# Patient Record
Sex: FEMALE | Race: WHITE
[De-identification: names, ages, dates, MRNs, and addresses within clinical notes are randomized per-mention and may not be internally consistent; named-entity substitution may affect disease eponyms.]

---

## 2017-09-17 ENCOUNTER — HOSPITAL ENCOUNTER (EMERGENCY)
Dept: HOSPITAL 23 - SED | Age: 37
Discharge: HOME | End: 2017-09-17
Payer: COMMERCIAL

## 2017-09-17 DIAGNOSIS — R10.9: Primary | ICD-10-CM

## 2017-09-17 DIAGNOSIS — Z79.891: ICD-10-CM

## 2017-09-17 DIAGNOSIS — Z88.8: ICD-10-CM

## 2017-09-17 DIAGNOSIS — Z88.6: ICD-10-CM

## 2017-09-17 DIAGNOSIS — K21.9: ICD-10-CM

## 2017-09-17 LAB
BARBITURATES UR QL SCN: 0.5 MG/DL
BARBITURATES UR QL SCN: 4.3 G/DL
BASOPHIL#: 0.1 X10E3
BASOPHIL%: 0.9 %
BENZODIAZ UR QL SCN: 14 U/L
BENZODIAZ UR QL SCN: 19 U/L
BLOOD UREA NITROGEN: 8 MG/DL
BUN/CREATININE RATIO: 13.33
BZE UR QL SCN: 58 U/L
CALCIUM SERUM: 9.2 MG/DL
CK MB SERPL-RTO: 13 %
CK MB SERPL-RTO: 34.4 G/DL
CREATININE SERUM: 0.6 MG/DL
DIFF IND: NO
EOSINOPHIL#: 0.1 X10E3
EOSINOPHIL%: 1.2 %
GLOM FILT RATE ESTIMATED: 114.8 ML/MIN
GLUCOSE FASTING: 84 MG/DL
HEMATOCRIT: 39.2 %
HEMOGLOBIN: 13.5 GM/DL
KETONES UR QL: 104 MMOL/L
KETONES UR QL: 27 MMOL/L
LIPASE: 25 U/L
LYMPHOCYTE#: 1.7 X10E3
LYMPHOCYTE%: 22 %
MEAN CELL VOLUME: 89.7 FL
MEAN CORPUSCULAR HEMOGLOBIN: 30.8 PG
MEAN PLATELET VOLUME: 7.4 FL
MICRO INDICATED?: NO
MONOCYTE#: 0.4 X10E3
MONOCYTE%: 5.3 %
NEUTROPHIL#: 5.3 X10E3
NEUTROPHIL%: 70.6 %
PLATELET COUNT: 330 X10E3
POTASSIUM: 4 MMOL/L
PROTEIN TOTAL SERUM: 7.4 G/DL
RED BLOOD COUNT: 4.37 X10E
SODIUM: 138 MMOL/L
URINE APPEARANCE: CLEAR
URINE BILIRUBIN: (no result)
URINE BLOOD: (no result)
URINE COLOR: YELLOW
URINE GLUCOSE: (no result) MG/DL
URINE KETONE: (no result)
URINE LEUKOCYTE ESTERASE: (no result)
URINE NITRATE: (no result)
URINE PH: 7.5
URINE PROTEIN: (no result)
URINE SOURCE: (no result)
URINE SPECIFIC GRAVITY: 1.02
URINE UROBILINOGEN: 0.2 MG/DL
WHITE BLOOD COUNT: 7.6 X10E3

## 2017-09-18 LAB
METHADONE UR QL SCN: 32.7 NG/ML
METHADONE UR QL SCN: <1 NG/ML
POC - TROPONIN: <0.05 NG/ML

## 2022-05-04 ENCOUNTER — HOSPITAL ENCOUNTER (EMERGENCY)
Facility: HOSPITAL | Age: 42
Discharge: HOME | End: 2022-05-04
Attending: EMERGENCY MEDICINE | Admitting: EMERGENCY MEDICINE

## 2022-05-04 ENCOUNTER — APPOINTMENT (OUTPATIENT)
Dept: GENERAL RADIOLOGY | Facility: HOSPITAL | Age: 42
End: 2022-05-04

## 2022-05-04 VITALS
BODY MASS INDEX: 34.87 KG/M2 | HEART RATE: 103 BPM | TEMPERATURE: 97.4 F | SYSTOLIC BLOOD PRESSURE: 125 MMHG | DIASTOLIC BLOOD PRESSURE: 86 MMHG | RESPIRATION RATE: 16 BRPM | WEIGHT: 217 LBS | HEIGHT: 66 IN | OXYGEN SATURATION: 98 %

## 2022-05-04 DIAGNOSIS — R07.9 CHEST PAIN IN ADULT: Primary | ICD-10-CM

## 2022-05-04 LAB
ALBUMIN SERPL-MCNC: 3.9 G/DL (ref 3.5–5.2)
ALBUMIN/GLOB SERPL: 1.3 G/DL
ALP SERPL-CCNC: 90 U/L (ref 39–117)
ALT SERPL W P-5'-P-CCNC: 15 U/L (ref 1–33)
ANION GAP SERPL CALCULATED.3IONS-SCNC: 12 MMOL/L (ref 5–15)
AST SERPL-CCNC: 13 U/L (ref 1–32)
BASOPHILS # BLD AUTO: 0.06 10*3/MM3 (ref 0–0.2)
BASOPHILS NFR BLD AUTO: 0.5 % (ref 0–1.5)
BILIRUB SERPL-MCNC: 0.3 MG/DL (ref 0–1.2)
BUN SERPL-MCNC: 6 MG/DL (ref 6–20)
BUN/CREAT SERPL: 10.9 (ref 7–25)
CALCIUM SPEC-SCNC: 8.8 MG/DL (ref 8.6–10.5)
CHLORIDE SERPL-SCNC: 103 MMOL/L (ref 98–107)
CO2 SERPL-SCNC: 24 MMOL/L (ref 22–29)
CREAT SERPL-MCNC: 0.55 MG/DL (ref 0.57–1)
DEPRECATED RDW RBC AUTO: 42.1 FL (ref 37–54)
EGFRCR SERPLBLD CKD-EPI 2021: 116.1 ML/MIN/1.73
EOSINOPHIL # BLD AUTO: 0.12 10*3/MM3 (ref 0–0.4)
EOSINOPHIL NFR BLD AUTO: 1.1 % (ref 0.3–6.2)
ERYTHROCYTE [DISTWIDTH] IN BLOOD BY AUTOMATED COUNT: 13.4 % (ref 12.3–15.4)
GLOBULIN UR ELPH-MCNC: 3 GM/DL
GLUCOSE SERPL-MCNC: 83 MG/DL (ref 65–99)
HCT VFR BLD AUTO: 36.4 % (ref 34–46.6)
HGB BLD-MCNC: 12.5 G/DL (ref 12–15.9)
IMM GRANULOCYTES # BLD AUTO: 0.03 10*3/MM3 (ref 0–0.05)
IMM GRANULOCYTES NFR BLD AUTO: 0.3 % (ref 0–0.5)
LIPASE SERPL-CCNC: 21 U/L (ref 13–60)
LYMPHOCYTES # BLD AUTO: 1.96 10*3/MM3 (ref 0.7–3.1)
LYMPHOCYTES NFR BLD AUTO: 17.2 % (ref 19.6–45.3)
MCH RBC QN AUTO: 30 PG (ref 26.6–33)
MCHC RBC AUTO-ENTMCNC: 34.3 G/DL (ref 31.5–35.7)
MCV RBC AUTO: 87.5 FL (ref 79–97)
MONOCYTES # BLD AUTO: 0.58 10*3/MM3 (ref 0.1–0.9)
MONOCYTES NFR BLD AUTO: 5.1 % (ref 5–12)
NEUTROPHILS NFR BLD AUTO: 75.8 % (ref 42.7–76)
NEUTROPHILS NFR BLD AUTO: 8.63 10*3/MM3 (ref 1.7–7)
NRBC BLD AUTO-RTO: 0 /100 WBC (ref 0–0.2)
NT-PROBNP SERPL-MCNC: 34.2 PG/ML (ref 0–450)
PLATELET # BLD AUTO: 387 10*3/MM3 (ref 140–450)
PMV BLD AUTO: 8.7 FL (ref 6–12)
POTASSIUM SERPL-SCNC: 3.9 MMOL/L (ref 3.5–5.2)
PROT SERPL-MCNC: 6.9 G/DL (ref 6–8.5)
QT INTERVAL: 356 MS
RBC # BLD AUTO: 4.16 10*6/MM3 (ref 3.77–5.28)
SODIUM SERPL-SCNC: 139 MMOL/L (ref 136–145)
TROPONIN T SERPL-MCNC: <0.01 NG/ML (ref 0–0.03)
TROPONIN T SERPL-MCNC: <0.01 NG/ML (ref 0–0.03)
WBC NRBC COR # BLD: 11.38 10*3/MM3 (ref 3.4–10.8)

## 2022-05-04 PROCEDURE — 71045 X-RAY EXAM CHEST 1 VIEW: CPT

## 2022-05-04 PROCEDURE — 99283 EMERGENCY DEPT VISIT LOW MDM: CPT

## 2022-05-04 PROCEDURE — 83880 ASSAY OF NATRIURETIC PEPTIDE: CPT | Performed by: EMERGENCY MEDICINE

## 2022-05-04 PROCEDURE — 83690 ASSAY OF LIPASE: CPT | Performed by: EMERGENCY MEDICINE

## 2022-05-04 PROCEDURE — 80053 COMPREHEN METABOLIC PANEL: CPT | Performed by: EMERGENCY MEDICINE

## 2022-05-04 PROCEDURE — 93005 ELECTROCARDIOGRAM TRACING: CPT

## 2022-05-04 PROCEDURE — 84484 ASSAY OF TROPONIN QUANT: CPT | Performed by: EMERGENCY MEDICINE

## 2022-05-04 PROCEDURE — 93005 ELECTROCARDIOGRAM TRACING: CPT | Performed by: EMERGENCY MEDICINE

## 2022-05-04 PROCEDURE — 85025 COMPLETE CBC W/AUTO DIFF WBC: CPT | Performed by: EMERGENCY MEDICINE

## 2022-05-04 PROCEDURE — 93010 ELECTROCARDIOGRAM REPORT: CPT | Performed by: INTERNAL MEDICINE

## 2022-05-04 RX ORDER — SODIUM CHLORIDE 0.9 % (FLUSH) 0.9 %
10 SYRINGE (ML) INJECTION AS NEEDED
Status: DISCONTINUED | OUTPATIENT
Start: 2022-05-04 | End: 2022-05-04 | Stop reason: HOSPADM

## 2022-05-04 NOTE — ED PROVIDER NOTES
EMERGENCY DEPARTMENT ENCOUNTER    Room Number:  31/31  Date of encounter:  5/4/2022  PCP: Ada Manzanares MD  Historian: Patient      HPI:  Chief Complaint: Chest discomfort and high blood pressure  A complete HPI/ROS/PMH/PSH/SH/FH are unobtainable due to: Nothing    Context: Ivana Abraham is a 42 y.o. female who presents to the ED c/o patient presents to the emergency department in [place] last night after complaints of high blood pressure.  She said that labs were drawn and resulted but she never saw a provider.  She went home without being seen and went to urgent care this morning with similar symptoms and was sent to the emergency department.    Patient said her blood pressure yesterday was in the 190s systolic, she had dizziness, head was full, and she had some mild, vague discomfort or numbness in the left axilla.  It was not exertional, it came on when she was at rest, and today it is pretty much the same as yesterday.    Patient has no personal history of coronary disease, she is a smoker and has a positive family history of coronary artery disease.      PAST MEDICAL HISTORY  Active Ambulatory Problems     Diagnosis Date Noted   • No Active Ambulatory Problems     Resolved Ambulatory Problems     Diagnosis Date Noted   • No Resolved Ambulatory Problems     Past Medical History:   Diagnosis Date   • Anxiety    • Enlarged heart    • GERD (gastroesophageal reflux disease)    • Leaky heart valve          PAST SURGICAL HISTORY  Past Surgical History:   Procedure Laterality Date   • ANKLE SURGERY     • BACK SURGERY     • BLADDER REPAIR     • CHOLECYSTECTOMY     • DILATATION AND CURETTAGE     • HYSTERECTOMY     • SKIN SURGERY           FAMILY HISTORY  History reviewed. No pertinent family history.      SOCIAL HISTORY  Social History     Socioeconomic History   • Marital status: Single   Tobacco Use   • Smoking status: Current Every Day Smoker     Types: Cigarettes   Substance and Sexual Activity   • Alcohol use: Not  Currently   • Drug use: Yes     Types: Marijuana   • Sexual activity: Yes         ALLERGIES  Docusate, Ibuprofen, Promethazine, Stadol [butorphanol], and Sulfamethoxazole-trimethoprim        REVIEW OF SYSTEMS  Review of Systems     All systems reviewed and negative except for those discussed in HPI.       PHYSICAL EXAM    I have reviewed the triage vital signs and nursing notes.    ED Triage Vitals [05/04/22 1107]   Temp Heart Rate Resp BP SpO2   97.4 °F (36.3 °C) 104 16 -- 99 %      Temp src Heart Rate Source Patient Position BP Location FiO2 (%)   -- -- -- -- --       Physical Exam  GENERAL: not distressed  HENT: nares patent  EYES: no scleral icterus  CV: regular rhythm, regular rate  RESPIRATORY: normal effort, CTA bilaterally  ABDOMEN: soft  MUSCULOSKELETAL: no deformity, no calf tenderness or pedal edema  NEURO: alert, moves all extremities, follows commands  SKIN: warm, dry        LAB RESULTS  Recent Results (from the past 24 hour(s))   MAGNESIUM    Collection Time: 05/03/22  8:28 PM    Specimen: Fresh Frozen Plasma    Specimen type and source: Plasma, Blood   Result Value Ref Range    Magnesium 1.8 1.6 - 2.6 mg/dL   CBC AND DIFFERENTIAL    Collection Time: 05/03/22  8:28 PM    Specimen type and source: Whole Blood, Blood   Result Value Ref Range    WBC 12.71 (H) 4.5 - 11.0 10*3/uL    RBC 4.12 4.0 - 5.2 10*6/uL    Hemoglobin 12.2 12.0 - 16.0 g/dL    Hematocrit 38.4 36.0 - 46.0 %    MCV 93.2 80.0 - 100.0 fL    MCH 29.6 26.0 - 34.0 pg    MCHC 31.8 31.0 - 37.0 g/dL    RDW 14.1 12.0 - 16.8 %    Platelets 414 140 - 440 10*3/uL    MPV 9.0 8.4 - 12.4 fL    Differential Type Hospital CBC w/AutoDiff (arb'U) (arb'U)    Neutrophil Rel % 69.1 45 - 80 %    Lymphocyte Rel % 22.5 15 - 50 %    Monocyte Rel % 5.7 0 - 15 %    Eosinophil % 1.8 0 - 7 %    Basophil Rel % 0.6 0 - 2 %    Immature Grans % 0.3 0.0 - 1.0 %    nRBC 0 0 /100(WBC)    Neutrophils Absolute 8.78 2.0 - 8.8 10*3/uL    Lymphocytes Absolute 2.86 0.7 - 5.5  10*3/uL    Monocytes Absolute 0.72 0.0 - 1.7 10*3/uL    Eosinophils Absolute 0.23 0.0 - 0.8 10*3/uL    Basophils Absolute 0.08 0.0 - 0.2 10*3/uL    Immature Grans, Absolute 0.04 0.00 - 0.10 10*3/uL   TROPONIN (IN-HOUSE)    Collection Time: 05/03/22  8:28 PM    Specimen: Fresh Frozen Plasma    Specimen type and source: Plasma, Blood   Result Value Ref Range    Troponin I <0.010 0.000 - 0.034 ng/mL   ECG 12 Lead    Collection Time: 05/04/22 11:10 AM   Result Value Ref Range    QT Interval 356 ms   Comprehensive Metabolic Panel    Collection Time: 05/04/22 12:02 PM    Specimen: Blood   Result Value Ref Range    Glucose 83 65 - 99 mg/dL    BUN 6 6 - 20 mg/dL    Creatinine 0.55 (L) 0.57 - 1.00 mg/dL    Sodium 139 136 - 145 mmol/L    Potassium 3.9 3.5 - 5.2 mmol/L    Chloride 103 98 - 107 mmol/L    CO2 24.0 22.0 - 29.0 mmol/L    Calcium 8.8 8.6 - 10.5 mg/dL    Total Protein 6.9 6.0 - 8.5 g/dL    Albumin 3.90 3.50 - 5.20 g/dL    ALT (SGPT) 15 1 - 33 U/L    AST (SGOT) 13 1 - 32 U/L    Alkaline Phosphatase 90 39 - 117 U/L    Total Bilirubin 0.3 0.0 - 1.2 mg/dL    Globulin 3.0 gm/dL    A/G Ratio 1.3 g/dL    BUN/Creatinine Ratio 10.9 7.0 - 25.0    Anion Gap 12.0 5.0 - 15.0 mmol/L    eGFR 116.1 >60.0 mL/min/1.73   Lipase    Collection Time: 05/04/22 12:02 PM    Specimen: Blood   Result Value Ref Range    Lipase 21 13 - 60 U/L   BNP    Collection Time: 05/04/22 12:02 PM    Specimen: Blood   Result Value Ref Range    proBNP 34.2 0.0 - 450.0 pg/mL   Troponin    Collection Time: 05/04/22 12:02 PM    Specimen: Blood   Result Value Ref Range    Troponin T <0.010 0.000 - 0.030 ng/mL   CBC Auto Differential    Collection Time: 05/04/22 12:02 PM    Specimen: Blood   Result Value Ref Range    WBC 11.38 (H) 3.40 - 10.80 10*3/mm3    RBC 4.16 3.77 - 5.28 10*6/mm3    Hemoglobin 12.5 12.0 - 15.9 g/dL    Hematocrit 36.4 34.0 - 46.6 %    MCV 87.5 79.0 - 97.0 fL    MCH 30.0 26.6 - 33.0 pg    MCHC 34.3 31.5 - 35.7 g/dL    RDW 13.4 12.3 - 15.4 %     RDW-SD 42.1 37.0 - 54.0 fl    MPV 8.7 6.0 - 12.0 fL    Platelets 387 140 - 450 10*3/mm3    Neutrophil % 75.8 42.7 - 76.0 %    Lymphocyte % 17.2 (L) 19.6 - 45.3 %    Monocyte % 5.1 5.0 - 12.0 %    Eosinophil % 1.1 0.3 - 6.2 %    Basophil % 0.5 0.0 - 1.5 %    Immature Grans % 0.3 0.0 - 0.5 %    Neutrophils, Absolute 8.63 (H) 1.70 - 7.00 10*3/mm3    Lymphocytes, Absolute 1.96 0.70 - 3.10 10*3/mm3    Monocytes, Absolute 0.58 0.10 - 0.90 10*3/mm3    Eosinophils, Absolute 0.12 0.00 - 0.40 10*3/mm3    Basophils, Absolute 0.06 0.00 - 0.20 10*3/mm3    Immature Grans, Absolute 0.03 0.00 - 0.05 10*3/mm3    nRBC 0.0 0.0 - 0.2 /100 WBC   Troponin    Collection Time: 22  2:03 PM    Specimen: Blood   Result Value Ref Range    Troponin T <0.010 0.000 - 0.030 ng/mL       Ordered the above labs and independently reviewed the results.        RADIOLOGY  XR Chest 1 View    Result Date: 2022  XR CHEST 1 VW-  HISTORY: Female who is 42 years-old,  chest pain  TECHNIQUE: Frontal view of the chest  COMPARISON: None available  FINDINGS: Heart, mediastinum and pulmonary vasculature are unremarkable. No focal pulmonary consolidation, pleural effusion, or pneumothorax. No acute osseous process.      No evidence for acute pulmonary process. Follow-up as clinical indications persist.  This report was finalized on 2022 12:01 PM by Dr. Bebo Man M.D.      XR Chest 1 Vw    Result Date: 5/3/2022  REVIEWING YOUR TEST RESULTS IN [facility] IS NOT A SUBSTITUTE FOR DISCUSSING THOSE RESULTS WITH YOUR HEALTH CARE PROVIDER. PLEASE CONTACT YOUR PROVIDER VIA [employer] TO DISCUSS ANY QUESTIONS OR CONCERNS YOU MAY HAVE REGARDING THESE TEST RESULTS.  RADIOLOGY REPORT FACILITY:  [facility] UNIT/AGE/GENDER: M.ED  ER      AGE:42 Y          SEX:F PATIENT NAME/:  DUKE LEVI D    1980 UNIT NUMBER:  [id number] ACCOUNT NUMBER:  [id number] ACCESSION NUMBER:  [id number] EXAM: XR CHEST 1 VW 5/3/2022  9:30 PM HISTORY: Chest Pain COMPARISON: None. TECHNIQUE:  Single PA view of the chest FINDINGS: The heart is normal in size. The lungs are clear. There is no pleural effusion or pneumothorax. IMPRESSION: No acute radiographic abnormality of the chest. Dictated by: Red Jackson M.D. Images and Report reviewed and interpreted by: Red Jackson M.D. <PS><Electronically signed by: Red Jackson M.D.> 05/03/2022 2135 D: 05/03/2022 2135 T: 05/03/2022 2135      I ordered the above noted radiological studies. Reviewed by me and discussed with radiologist.  See dictation for official radiology interpretation.      PROCEDURES    Procedures      MEDICATIONS GIVEN IN ER    Medications - No data to display      PROGRESS, DATA ANALYSIS, CONSULTS, AND MEDICAL DECISION MAKING    All labs have been independently reviewed by me.  All radiology studies have been reviewed by me and discussed with radiologist dictating the report.   EKG's independently viewed and interpreted by me.  Discussion below represents my analysis of pertinent findings related to patient's condition, differential diagnosis, treatment plan and final disposition.    *    ED Course as of 05/04/22 1720   Wed May 04, 2022   1719 CBC and chemistry unremarkable [DP]   1719 Lipase and proBNP normal [DP]   1719 Troponin negative x2 [DP]   1719 Patient has heart score of 2 due to age and family history [DP]   1719 EKG on arrival  Normal sinus rhythm at 90  , VT, QRS and QT  No acute ST segment changes are seen to suggest ischemia, there is nothing available for comparison at this time. [DP]   1720 Patient was also seen yesterday in emergency department had a negative cardiac work-up.  She is low risk for ACS and this is fairly atypical pain which can be followed up in the outpatient setting in my opinion.  I did offer the patient admission to the observation unit for expedited cardiology work-up but she declined and prefers to follow-up [DP]      ED Course User Index  [DP]  Peng Pinedo MD           PPE: The patient wore a surgical mask throughout the entire patient encounter. I wore an N95.    AS OF 17:20 EDT VITALS:    BP - 125/86  HR - 103  TEMP - 97.4 °F (36.3 °C)  O2 SATS - 98%        DIAGNOSIS  Final diagnoses:   Chest pain in adult         DISPOSITION  Discharge         Peng Pinedo MD  05/04/22 5731

## 2022-05-04 NOTE — ED NOTES
Patient from home via PV; c/o chest pain that started yesterday. also having HTN. Seen at [facility] yesterday, had ECG and blood work performed. Patient has no hx of HTN.

## 2022-05-06 ENCOUNTER — OFFICE VISIT (OUTPATIENT)
Dept: CARDIOLOGY | Facility: CLINIC | Age: 42
End: 2022-05-06

## 2022-05-06 VITALS
HEIGHT: 66 IN | DIASTOLIC BLOOD PRESSURE: 80 MMHG | HEART RATE: 94 BPM | BODY MASS INDEX: 34.72 KG/M2 | SYSTOLIC BLOOD PRESSURE: 120 MMHG | WEIGHT: 216 LBS

## 2022-05-06 DIAGNOSIS — R07.2 PRECORDIAL PAIN: ICD-10-CM

## 2022-05-06 DIAGNOSIS — I10 BENIGN ESSENTIAL HTN: ICD-10-CM

## 2022-05-06 DIAGNOSIS — R00.2 PALPITATIONS: Primary | ICD-10-CM

## 2022-05-06 PROCEDURE — 99204 OFFICE O/P NEW MOD 45 MIN: CPT | Performed by: INTERNAL MEDICINE

## 2022-05-06 RX ORDER — GABAPENTIN 600 MG/1
600 TABLET ORAL DAILY
COMMUNITY

## 2022-05-06 RX ORDER — HYDROCODONE BITARTRATE AND ACETAMINOPHEN 10; 325 MG/1; MG/1
TABLET ORAL EVERY 6 HOURS SCHEDULED
COMMUNITY

## 2022-05-06 RX ORDER — MONTELUKAST SODIUM 10 MG/1
10 TABLET ORAL NIGHTLY
COMMUNITY

## 2022-05-06 RX ORDER — ESCITALOPRAM OXALATE 10 MG/1
TABLET ORAL
COMMUNITY
Start: 2021-11-15

## 2022-05-06 RX ORDER — ALBUTEROL SULFATE 90 UG/1
AEROSOL, METERED RESPIRATORY (INHALATION)
COMMUNITY
Start: 2022-01-28

## 2022-05-06 RX ORDER — ERGOCALCIFEROL 1.25 MG/1
CAPSULE ORAL
COMMUNITY
End: 2022-05-06 | Stop reason: SDUPTHER

## 2022-05-06 RX ORDER — COVID-19 ANTIGEN TEST
KIT MISCELLANEOUS
COMMUNITY
End: 2022-05-06

## 2022-05-06 RX ORDER — LORATADINE 10 MG/1
10 TABLET ORAL DAILY
COMMUNITY
Start: 2022-05-05 | End: 2022-08-03

## 2022-05-06 RX ORDER — FUROSEMIDE 40 MG/1
40 TABLET ORAL 2 TIMES DAILY
COMMUNITY

## 2022-05-06 RX ORDER — CETIRIZINE HYDROCHLORIDE 10 MG/1
TABLET ORAL
COMMUNITY
End: 2022-05-06

## 2022-05-06 RX ORDER — GUAIFENESIN 600 MG/1
TABLET, EXTENDED RELEASE ORAL
COMMUNITY
End: 2022-05-06

## 2022-05-06 RX ORDER — HYDROXYZINE HYDROCHLORIDE 25 MG/1
TABLET, FILM COATED ORAL
COMMUNITY

## 2022-05-06 RX ORDER — FLUTICASONE PROPIONATE 50 MCG
SPRAY, SUSPENSION (ML) NASAL
COMMUNITY
Start: 2022-03-25

## 2022-05-06 RX ORDER — ONDANSETRON 4 MG/1
TABLET, ORALLY DISINTEGRATING ORAL
COMMUNITY

## 2022-05-06 RX ORDER — FLUCONAZOLE 150 MG/1
TABLET ORAL
COMMUNITY
End: 2022-05-06

## 2022-05-06 RX ORDER — POTASSIUM CHLORIDE 20 MEQ/1
TABLET, EXTENDED RELEASE ORAL
COMMUNITY

## 2022-05-06 RX ORDER — ESOMEPRAZOLE MAGNESIUM 10 MG/1
GRANULE, FOR SUSPENSION, EXTENDED RELEASE ORAL
COMMUNITY

## 2022-05-06 RX ORDER — PAROXETINE 10 MG/1
TABLET, FILM COATED ORAL
COMMUNITY
End: 2022-05-06 | Stop reason: ALTCHOICE

## 2022-05-06 RX ORDER — MECLIZINE HYDROCHLORIDE 25 MG/1
TABLET ORAL
COMMUNITY

## 2022-05-06 NOTE — PROGRESS NOTES
Subjective:     Encounter Date:22      Patient ID: Ivana Abraham is a 42 y.o. female.    Chief Complaint:  History of Present Illness    Dear Dr. Manzanares,    I had the pleasure of seeing this patient in the office today for initial evaluation and consultation.  I appreciate that you sent her in to see us.  They come in today to be seen for chest discomfort.  She is also demonstrating labile blood pressure values.    On May 3 patient was seen in urgent care center for chest discomfort and hypertension.  Blood work was drawn but she was not seen.  She was then seen in the office by Dr. Norbert Ansari with complaint of chest pain and was sent to the emergency room.  In the emergency room patient was seen by Dr. Pinedo.  Evaluation emergency room was unremarkable.    Patient was then recommended to be seen as an outpatient.    Patient had echocardiogram in January at [facility].  This demonstrated mild LVH, minimal tragus regurgitation, normal left ventricular systolic function.    She has been having episodes of elevated blood pressure.  She just started to keep a blood pressure log at home.  She says sometimes that it would just seem to suddenly go up.  She is also been having episodes of sudden anxiety.  She has had anxiety off and on for much of her life but it got worse after her father  in 2019.  He was actually a patient here as well, he  of coronary disease at age 70.  No other particular family history of cardiac/coronary artery disease except her mother was just found to have some heart failure.  No family history of premature heart disease, no familial history of sudden death.    The following portions of the patient's history were reviewed and updated as appropriate: allergies, current medications, past family history, past medical history, past social history, past surgical history and problem list.    Procedures       Objective:     Vitals:    22 1431   BP: 120/80   Pulse:  "94   Weight: 98 kg (216 lb)   Height: 167.6 cm (66\")     Body mass index is 34.86 kg/m².      Vitals reviewed.   Constitutional:       General: Not in acute distress.     Appearance: Well-developed. Not diaphoretic.   Eyes:      General:         Right eye: No discharge.         Left eye: No discharge.      Conjunctiva/sclera: Conjunctivae normal.      Pupils: Pupils are equal, round, and reactive to light.   HENT:      Head: Normocephalic and atraumatic.      Nose: Nose normal.   Neck:      Thyroid: No thyromegaly.      Trachea: No tracheal deviation.      Lymphadenopathy: No cervical adenopathy.   Pulmonary:      Effort: Pulmonary effort is normal. No respiratory distress.      Breath sounds: Normal breath sounds. No stridor.   Chest:      Chest wall: Not tender to palpatation.   Cardiovascular:      Normal rate. Regular rhythm.      Murmurs: There is no murmur.      . No S3 gallop. No click. No rub.   Pulses:     Intact distal pulses.   Edema:     Peripheral edema absent.   Abdominal:      General: Bowel sounds are normal. There is no distension.      Palpations: Abdomen is soft. There is no abdominal mass.      Tenderness: There is no abdominal tenderness. There is no guarding or rebound.   Musculoskeletal: Normal range of motion.         General: No tenderness or deformity.      Cervical back: Normal range of motion and neck supple. Skin:     General: Skin is warm and dry.      Findings: No erythema or rash.   Neurological:      Mental Status: Alert and oriented to person, place, and time.      Deep Tendon Reflexes: Reflexes are normal and symmetric.   Psychiatric:         Thought Content: Thought content normal.         Data and records reviewed:     Lab Results   Component Value Date    GLUCOSE 83 05/04/2022    BUN 6 05/04/2022    CREATININE 0.55 (L) 05/04/2022    BCR 10.9 05/04/2022    K 3.9 05/04/2022    CO2 24.0 05/04/2022    CALCIUM 8.8 05/04/2022    ALBUMIN 3.90 05/04/2022    AST 13 05/04/2022    ALT 15 " 05/04/2022     No results found for: CHOL  No results found for: TRIG  No results found for: HDL  No results found for: LDL  No results found for: VLDL  No results found for: LDLHDL  CBC    CBC 5/3/22 5/4/22   WBC 12.71 (A) 11.38 (A)   RBC 4.12 4.16   Hemoglobin 12.2 12.5   Hematocrit 38.4 36.4   MCV 93.2 87.5   MCH 29.6 30.0   MCHC 31.8 34.3   RDW 14.1 13.4   Platelets 414 387   (A) Abnormal value            XR Chest 2 View    Result Date: 4/18/2022  No acute cardiopulmonary process.   Signed by Ora Weeks, DO    XR Chest 1 View    Result Date: 5/4/2022  No evidence for acute pulmonary process. Follow-up as clinical indications persist.  This report was finalized on 5/4/2022 12:01 PM by Dr. Bebo Man M.D.              Assessment:          Diagnosis Plan   1. Palpitations  Treadmill Stress Test    Catecholamine+VMA, 24-Hr Urine - Urine, Clean Catch   2. Precordial pain  Treadmill Stress Test    Catecholamine+VMA, 24-Hr Urine - Urine, Clean Catch   3. Benign essential HTN  Treadmill Stress Test    Catecholamine+VMA, 24-Hr Urine - Urine, Clean Catch          Plan:       .  Labile hypertension- she apparently is having episodes of sudden increase in heart rate tachycardia anxiety, we will check urinary VMA  2.  Chest discomfort- unremarkable EKG, evaluation ER unremarkable we will arrange for stress test  3.?  Hypertension, blood pressures been labile, we will have her keep a blood pressure log.    Thank you very much for allowing us to participate in the care of this pleasant patient.  Please don't hesitate to call if I can be of assistance in any way.      Current Outpatient Medications:   •  albuterol sulfate  (90 Base) MCG/ACT inhaler, albuterol sulfate HFA 90 mcg/actuation aerosol inhaler, Disp: , Rfl:   •  Cholecalciferol (VITAMIN D3 PO), Take 1 tablet by mouth Daily., Disp: , Rfl:   •  cyanocobalamin (VITAMIN B-12) 1000 MCG tablet, Take 1,000 mcg by mouth Daily., Disp: , Rfl:   •  escitalopram  (LEXAPRO) 10 MG tablet, escitalopram 10 mg tablet  Take 1 tablet every day by oral route as directed for 30 days., Disp: , Rfl:   •  esomeprazole (NexIUM) 10 MG packet, Take  by mouth., Disp: , Rfl:   •  fluticasone (FLONASE) 50 MCG/ACT nasal spray, fluticasone propionate 50 mcg/actuation nasal spray,suspension, Disp: , Rfl:   •  furosemide (LASIX) 40 MG tablet, Take 40 mg by mouth 2 (Two) Times a Day., Disp: , Rfl:   •  gabapentin (NEURONTIN) 600 MG tablet, Take 600 mg by mouth Daily., Disp: , Rfl:   •  HYDROcodone-acetaminophen (NORCO)  MG per tablet, Every 6 (Six) Hours., Disp: , Rfl:   •  hydrOXYzine (ATARAX) 25 MG tablet, hydroxyzine HCl 25 mg tablet, Disp: , Rfl:   •  loratadine (CLARITIN) 10 MG tablet, Take 10 mg by mouth Daily., Disp: , Rfl:   •  meclizine (ANTIVERT) 25 MG tablet, meclizine 25 mg tablet, Disp: , Rfl:   •  montelukast (SINGULAIR) 10 MG tablet, Take 10 mg by mouth Every Night., Disp: , Rfl:   •  ondansetron ODT (ZOFRAN-ODT) 4 MG disintegrating tablet, ondansetron 4 mg disintegrating tablet, Disp: , Rfl:   •  potassium chloride (K-DUR,KLOR-CON) 20 MEQ CR tablet, potassium chloride ER 20 mEq tablet,extended release  Take 1 tablet every day by oral route as directed for 30 days., Disp: , Rfl:          No follow-ups on file.

## 2022-05-10 ENCOUNTER — APPOINTMENT (OUTPATIENT)
Dept: CARDIOLOGY | Facility: HOSPITAL | Age: 42
End: 2022-05-10

## 2022-06-03 ENCOUNTER — HOSPITAL ENCOUNTER (OUTPATIENT)
Dept: CARDIOLOGY | Facility: HOSPITAL | Age: 42
Discharge: HOME | End: 2022-06-03
Admitting: INTERNAL MEDICINE

## 2022-06-03 DIAGNOSIS — I10 BENIGN ESSENTIAL HTN: ICD-10-CM

## 2022-06-03 DIAGNOSIS — R00.2 PALPITATIONS: ICD-10-CM

## 2022-06-03 DIAGNOSIS — R07.2 PRECORDIAL PAIN: ICD-10-CM

## 2022-06-03 LAB
BH CV STRESS BP STAGE 1: NORMAL
BH CV STRESS DURATION MIN STAGE 1: 3
BH CV STRESS DURATION MIN STAGE 2: 1
BH CV STRESS DURATION SEC STAGE 1: 0
BH CV STRESS DURATION SEC STAGE 2: 0
BH CV STRESS GRADE STAGE 1: 10
BH CV STRESS GRADE STAGE 2: 12
BH CV STRESS HR STAGE 1: 140
BH CV STRESS HR STAGE 2: 152
BH CV STRESS METS STAGE 1: 5
BH CV STRESS METS STAGE 2: 5
BH CV STRESS PROTOCOL 1: NORMAL
BH CV STRESS RECOVERY BP: NORMAL MMHG
BH CV STRESS RECOVERY HR: 106 BPM
BH CV STRESS SPEED STAGE 1: 1.7
BH CV STRESS SPEED STAGE 2: 2.5
BH CV STRESS STAGE 1: 1
BH CV STRESS STAGE 2: 2
MAXIMAL PREDICTED HEART RATE: 176 BPM
PERCENT MAX PREDICTED HR: 86.36 %
STRESS BASELINE BP: NORMAL MMHG
STRESS BASELINE HR: 103 BPM
STRESS PERCENT HR: 102 %
STRESS POST ESTIMATED WORKLOAD: 5 METS
STRESS POST EXERCISE DUR MIN: 4 MIN
STRESS POST EXERCISE DUR SEC: 0 SEC
STRESS POST PEAK BP: NORMAL MMHG
STRESS POST PEAK HR: 152 BPM
STRESS TARGET HR: 150 BPM

## 2022-06-03 PROCEDURE — 93016 CV STRESS TEST SUPVJ ONLY: CPT | Performed by: INTERNAL MEDICINE

## 2022-06-03 PROCEDURE — 93018 CV STRESS TEST I&R ONLY: CPT | Performed by: INTERNAL MEDICINE

## 2022-06-03 PROCEDURE — 93017 CV STRESS TEST TRACING ONLY: CPT

## 2024-04-16 ENCOUNTER — HOSPITAL ENCOUNTER (EMERGENCY)
Facility: HOSPITAL | Age: 44
Discharge: HOME | End: 2024-04-16
Attending: STUDENT IN AN ORGANIZED HEALTH CARE EDUCATION/TRAINING PROGRAM | Admitting: STUDENT IN AN ORGANIZED HEALTH CARE EDUCATION/TRAINING PROGRAM
Payer: COMMERCIAL

## 2024-04-16 VITALS
HEART RATE: 100 BPM | BODY MASS INDEX: 35.36 KG/M2 | HEIGHT: 66 IN | WEIGHT: 220 LBS | OXYGEN SATURATION: 98 % | DIASTOLIC BLOOD PRESSURE: 83 MMHG | SYSTOLIC BLOOD PRESSURE: 122 MMHG | TEMPERATURE: 98.8 F | RESPIRATION RATE: 16 BRPM

## 2024-04-16 DIAGNOSIS — J02.9 ACUTE PHARYNGITIS, UNSPECIFIED ETIOLOGY: Primary | ICD-10-CM

## 2024-04-16 LAB
ALBUMIN SERPL-MCNC: 3.7 G/DL (ref 3.5–5.2)
ALBUMIN/GLOB SERPL: 1.1 G/DL
ALP SERPL-CCNC: 90 U/L (ref 39–117)
ALT SERPL W P-5'-P-CCNC: 8 U/L (ref 1–33)
ANION GAP SERPL CALCULATED.3IONS-SCNC: 9.5 MMOL/L (ref 5–15)
AST SERPL-CCNC: 12 U/L (ref 1–32)
B PARAPERT DNA SPEC QL NAA+PROBE: NOT DETECTED
B PERT DNA SPEC QL NAA+PROBE: NOT DETECTED
BASOPHILS # BLD AUTO: 0.07 10*3/MM3 (ref 0–0.2)
BASOPHILS NFR BLD AUTO: 0.7 % (ref 0–1.5)
BILIRUB SERPL-MCNC: <0.2 MG/DL (ref 0–1.2)
BILIRUB UR QL STRIP: NEGATIVE
BUN SERPL-MCNC: 7 MG/DL (ref 6–20)
BUN/CREAT SERPL: 10 (ref 7–25)
C PNEUM DNA NPH QL NAA+NON-PROBE: NOT DETECTED
CALCIUM SPEC-SCNC: 9 MG/DL (ref 8.6–10.5)
CHLORIDE SERPL-SCNC: 101 MMOL/L (ref 98–107)
CLARITY UR: CLEAR
CO2 SERPL-SCNC: 27.5 MMOL/L (ref 22–29)
COLOR UR: YELLOW
CREAT SERPL-MCNC: 0.7 MG/DL (ref 0.57–1)
DEPRECATED RDW RBC AUTO: 42.4 FL (ref 37–54)
EGFRCR SERPLBLD CKD-EPI 2021: 108.8 ML/MIN/1.73
EOSINOPHIL # BLD AUTO: 0.19 10*3/MM3 (ref 0–0.4)
EOSINOPHIL NFR BLD AUTO: 2 % (ref 0.3–6.2)
ERYTHROCYTE [DISTWIDTH] IN BLOOD BY AUTOMATED COUNT: 13 % (ref 12.3–15.4)
FLUAV SUBTYP SPEC NAA+PROBE: NOT DETECTED
FLUBV RNA ISLT QL NAA+PROBE: NOT DETECTED
GLOBULIN UR ELPH-MCNC: 3.4 GM/DL
GLUCOSE SERPL-MCNC: 112 MG/DL (ref 65–99)
GLUCOSE UR STRIP-MCNC: NEGATIVE MG/DL
HADV DNA SPEC NAA+PROBE: NOT DETECTED
HCOV 229E RNA SPEC QL NAA+PROBE: NOT DETECTED
HCOV HKU1 RNA SPEC QL NAA+PROBE: NOT DETECTED
HCOV NL63 RNA SPEC QL NAA+PROBE: NOT DETECTED
HCOV OC43 RNA SPEC QL NAA+PROBE: NOT DETECTED
HCT VFR BLD AUTO: 37.6 % (ref 34–46.6)
HETEROPH AB SER QL LA: NEGATIVE
HGB BLD-MCNC: 12.1 G/DL (ref 12–15.9)
HGB UR QL STRIP.AUTO: NEGATIVE
HMPV RNA NPH QL NAA+NON-PROBE: NOT DETECTED
HPIV1 RNA ISLT QL NAA+PROBE: NOT DETECTED
HPIV2 RNA SPEC QL NAA+PROBE: NOT DETECTED
HPIV3 RNA NPH QL NAA+PROBE: NOT DETECTED
HPIV4 P GENE NPH QL NAA+PROBE: NOT DETECTED
IMM GRANULOCYTES # BLD AUTO: 0.04 10*3/MM3 (ref 0–0.05)
IMM GRANULOCYTES NFR BLD AUTO: 0.4 % (ref 0–0.5)
KETONES UR QL STRIP: NEGATIVE
LEUKOCYTE ESTERASE UR QL STRIP.AUTO: NEGATIVE
LYMPHOCYTES # BLD AUTO: 2.42 10*3/MM3 (ref 0.7–3.1)
LYMPHOCYTES NFR BLD AUTO: 24.9 % (ref 19.6–45.3)
M PNEUMO IGG SER IA-ACNC: NOT DETECTED
MCH RBC QN AUTO: 28.7 PG (ref 26.6–33)
MCHC RBC AUTO-ENTMCNC: 32.2 G/DL (ref 31.5–35.7)
MCV RBC AUTO: 89.3 FL (ref 79–97)
MONOCYTES # BLD AUTO: 0.49 10*3/MM3 (ref 0.1–0.9)
MONOCYTES NFR BLD AUTO: 5 % (ref 5–12)
NEUTROPHILS NFR BLD AUTO: 6.5 10*3/MM3 (ref 1.7–7)
NEUTROPHILS NFR BLD AUTO: 67 % (ref 42.7–76)
NITRITE UR QL STRIP: NEGATIVE
NRBC BLD AUTO-RTO: 0 /100 WBC (ref 0–0.2)
PH UR STRIP.AUTO: 6.5 [PH] (ref 5–8)
PLATELET # BLD AUTO: 398 10*3/MM3 (ref 140–450)
PMV BLD AUTO: 8.9 FL (ref 6–12)
POTASSIUM SERPL-SCNC: 4.3 MMOL/L (ref 3.5–5.2)
PROT SERPL-MCNC: 7.1 G/DL (ref 6–8.5)
PROT UR QL STRIP: NEGATIVE
RBC # BLD AUTO: 4.21 10*6/MM3 (ref 3.77–5.28)
RHINOVIRUS RNA SPEC NAA+PROBE: NOT DETECTED
RSV RNA NPH QL NAA+NON-PROBE: NOT DETECTED
SARS-COV-2 RNA NPH QL NAA+NON-PROBE: NOT DETECTED
SODIUM SERPL-SCNC: 138 MMOL/L (ref 136–145)
SP GR UR STRIP: 1.01 (ref 1–1.03)
UROBILINOGEN UR QL STRIP: NORMAL
WBC NRBC COR # BLD AUTO: 9.71 10*3/MM3 (ref 3.4–10.8)

## 2024-04-16 PROCEDURE — 96375 TX/PRO/DX INJ NEW DRUG ADDON: CPT

## 2024-04-16 PROCEDURE — 86308 HETEROPHILE ANTIBODY SCREEN: CPT | Performed by: STUDENT IN AN ORGANIZED HEALTH CARE EDUCATION/TRAINING PROGRAM

## 2024-04-16 PROCEDURE — 81003 URINALYSIS AUTO W/O SCOPE: CPT | Performed by: STUDENT IN AN ORGANIZED HEALTH CARE EDUCATION/TRAINING PROGRAM

## 2024-04-16 PROCEDURE — 0202U NFCT DS 22 TRGT SARS-COV-2: CPT | Performed by: STUDENT IN AN ORGANIZED HEALTH CARE EDUCATION/TRAINING PROGRAM

## 2024-04-16 PROCEDURE — 25010000002 KETOROLAC TROMETHAMINE PER 15 MG: Performed by: STUDENT IN AN ORGANIZED HEALTH CARE EDUCATION/TRAINING PROGRAM

## 2024-04-16 PROCEDURE — 80053 COMPREHEN METABOLIC PANEL: CPT | Performed by: STUDENT IN AN ORGANIZED HEALTH CARE EDUCATION/TRAINING PROGRAM

## 2024-04-16 PROCEDURE — 86665 EPSTEIN-BARR CAPSID VCA: CPT | Performed by: STUDENT IN AN ORGANIZED HEALTH CARE EDUCATION/TRAINING PROGRAM

## 2024-04-16 PROCEDURE — 86664 EPSTEIN-BARR NUCLEAR ANTIGEN: CPT | Performed by: STUDENT IN AN ORGANIZED HEALTH CARE EDUCATION/TRAINING PROGRAM

## 2024-04-16 PROCEDURE — 96374 THER/PROPH/DIAG INJ IV PUSH: CPT

## 2024-04-16 PROCEDURE — 99283 EMERGENCY DEPT VISIT LOW MDM: CPT

## 2024-04-16 PROCEDURE — 25010000002 DEXAMETHASONE PER 1 MG: Performed by: STUDENT IN AN ORGANIZED HEALTH CARE EDUCATION/TRAINING PROGRAM

## 2024-04-16 PROCEDURE — 85025 COMPLETE CBC W/AUTO DIFF WBC: CPT | Performed by: STUDENT IN AN ORGANIZED HEALTH CARE EDUCATION/TRAINING PROGRAM

## 2024-04-16 RX ORDER — KETOROLAC TROMETHAMINE 10 MG/1
10 TABLET, FILM COATED ORAL EVERY 6 HOURS PRN
Qty: 30 TABLET | Refills: 0 | Status: SHIPPED | OUTPATIENT
Start: 2024-04-16

## 2024-04-16 RX ORDER — CEFDINIR 300 MG/1
300 CAPSULE ORAL 2 TIMES DAILY
Qty: 14 CAPSULE | Refills: 0 | Status: SHIPPED | OUTPATIENT
Start: 2024-04-16

## 2024-04-16 RX ORDER — DEXAMETHASONE 6 MG/1
6 TABLET ORAL
Qty: 4 TABLET | Refills: 0 | Status: SHIPPED | OUTPATIENT
Start: 2024-04-16

## 2024-04-16 RX ORDER — DEXAMETHASONE SODIUM PHOSPHATE 10 MG/ML
8 INJECTION INTRAMUSCULAR; INTRAVENOUS ONCE
Status: COMPLETED | OUTPATIENT
Start: 2024-04-16 | End: 2024-04-16

## 2024-04-16 RX ORDER — KETOROLAC TROMETHAMINE 15 MG/ML
15 INJECTION, SOLUTION INTRAMUSCULAR; INTRAVENOUS ONCE
Status: COMPLETED | OUTPATIENT
Start: 2024-04-16 | End: 2024-04-16

## 2024-04-16 RX ADMIN — DEXAMETHASONE SODIUM PHOSPHATE 8 MG: 10 INJECTION INTRAMUSCULAR; INTRAVENOUS at 18:33

## 2024-04-16 RX ADMIN — KETOROLAC TROMETHAMINE 15 MG: 15 INJECTION, SOLUTION INTRAMUSCULAR; INTRAVENOUS at 20:35

## 2024-04-16 NOTE — ED NOTES
Patient to ER via car from home for throat swelling and pain white spots on throat eye drainage and ear pain    Patient recently finished abx for same

## 2024-04-17 LAB
EBV NA IGG SER IA-ACNC: >600 U/ML (ref 0–17.9)
EBV VCA IGG SER IA-ACNC: >600 U/ML (ref 0–17.9)
EBV VCA IGM SER IA-ACNC: <36 U/ML (ref 0–35.9)
SERVICE CMNT-IMP: ABNORMAL

## 2024-04-17 NOTE — ED PROVIDER NOTES
EMERGENCY DEPARTMENT ENCOUNTER    Room Number:  14/14  PCP: Ada Manzanares MD  History obtained from: Patient      HPI:  Chief Complaint: Throat pain  A complete HPI/ROS/PMH/PSH/SH/FH are unobtainable due to:   Context: Ivana Abraham is a 44 y.o. female who presents to the ED c/o throat pain.  Ongoing for the last 2 to 3 weeks.  Initially felt like she had some improvement after course of Augmentin over the last several days she reports that it feels like it is getting worse again.  Feels like there is associated swelling.  No fever.  Some fullness in the left ear.  No nausea or vomiting.  Initially had some conjunctivitis however this is significantly improved with Romycin ointment.            PAST MEDICAL HISTORY  Active Ambulatory Problems     Diagnosis Date Noted    No Active Ambulatory Problems     Resolved Ambulatory Problems     Diagnosis Date Noted    No Resolved Ambulatory Problems     Past Medical History:   Diagnosis Date    Anxiety     Chest pain     Dizziness     Elevated blood pressure reading     Enlarged heart     GERD (gastroesophageal reflux disease)     Leaky heart valve          PAST SURGICAL HISTORY  Past Surgical History:   Procedure Laterality Date    ANKLE SURGERY      BACK SURGERY      BLADDER REPAIR      CHOLECYSTECTOMY      DILATATION AND CURETTAGE      HYSTERECTOMY      SKIN SURGERY           FAMILY HISTORY  Family History   Problem Relation Age of Onset    Heart failure Mother     Heart disease Father     Heart attack Father          SOCIAL HISTORY  Social History     Socioeconomic History    Marital status: Single   Tobacco Use    Smoking status: Every Day     Types: Cigarettes    Smokeless tobacco: Never   Substance and Sexual Activity    Alcohol use: Not Currently    Drug use: Yes     Types: Marijuana    Sexual activity: Yes         ALLERGIES  Docusate, Ibuprofen, Promethazine, Stadol [butorphanol], and Sulfamethoxazole-trimethoprim        REVIEW OF SYSTEMS    As per HPI      PHYSICAL  EXAM  ED Triage Vitals   Temp Heart Rate Resp BP SpO2   04/16/24 1610 04/16/24 1610 04/16/24 1610 04/16/24 1613 04/16/24 1610   98.8 °F (37.1 °C) (!) 125 16 137/85 98 %      Temp src Heart Rate Source Patient Position BP Location FiO2 (%)   -- -- -- -- --              Physical Exam  Constitutional:       General: She is not in acute distress.  HENT:      Head: Normocephalic and atraumatic.      Mouth/Throat:      Comments: Erythematous posterior oropharynx with slight exudate on the left tonsillar, no significant tonsillar swelling or asymmetry  Cardiovascular:      Rate and Rhythm: Normal rate and regular rhythm.   Pulmonary:      Effort: Pulmonary effort is normal. No respiratory distress.   Abdominal:      General: There is no distension.      Palpations: Abdomen is soft.      Tenderness: There is no abdominal tenderness.   Musculoskeletal:         General: No swelling or deformity.   Skin:     General: Skin is warm and dry.   Neurological:      Mental Status: She is alert. Mental status is at baseline.           Vital signs and nursing notes reviewed.          LAB RESULTS  Recent Results (from the past 24 hour(s))   Urinalysis With Microscopic If Indicated (No Culture) - Urine, Clean Catch    Collection Time: 04/16/24  6:20 PM    Specimen: Urine, Clean Catch   Result Value Ref Range    Color, UA Yellow Yellow, Straw    Appearance, UA Clear Clear    pH, UA 6.5 5.0 - 8.0    Specific Gravity, UA 1.008 1.005 - 1.030    Glucose, UA Negative Negative    Ketones, UA Negative Negative    Bilirubin, UA Negative Negative    Blood, UA Negative Negative    Protein, UA Negative Negative    Leuk Esterase, UA Negative Negative    Nitrite, UA Negative Negative    Urobilinogen, UA 0.2 E.U./dL 0.2 - 1.0 E.U./dL   Comprehensive Metabolic Panel    Collection Time: 04/16/24  6:25 PM    Specimen: Blood   Result Value Ref Range    Glucose 112 (H) 65 - 99 mg/dL    BUN 7 6 - 20 mg/dL    Creatinine 0.70 0.57 - 1.00 mg/dL    Sodium 138  136 - 145 mmol/L    Potassium 4.3 3.5 - 5.2 mmol/L    Chloride 101 98 - 107 mmol/L    CO2 27.5 22.0 - 29.0 mmol/L    Calcium 9.0 8.6 - 10.5 mg/dL    Total Protein 7.1 6.0 - 8.5 g/dL    Albumin 3.7 3.5 - 5.2 g/dL    ALT (SGPT) 8 1 - 33 U/L    AST (SGOT) 12 1 - 32 U/L    Alkaline Phosphatase 90 39 - 117 U/L    Total Bilirubin <0.2 0.0 - 1.2 mg/dL    Globulin 3.4 gm/dL    A/G Ratio 1.1 g/dL    BUN/Creatinine Ratio 10.0 7.0 - 25.0    Anion Gap 9.5 5.0 - 15.0 mmol/L    eGFR 108.8 >60.0 mL/min/1.73   Mononucleosis Screen    Collection Time: 04/16/24  6:25 PM    Specimen: Blood   Result Value Ref Range    Monospot Negative Negative   CBC Auto Differential    Collection Time: 04/16/24  6:25 PM    Specimen: Blood   Result Value Ref Range    WBC 9.71 3.40 - 10.80 10*3/mm3    RBC 4.21 3.77 - 5.28 10*6/mm3    Hemoglobin 12.1 12.0 - 15.9 g/dL    Hematocrit 37.6 34.0 - 46.6 %    MCV 89.3 79.0 - 97.0 fL    MCH 28.7 26.6 - 33.0 pg    MCHC 32.2 31.5 - 35.7 g/dL    RDW 13.0 12.3 - 15.4 %    RDW-SD 42.4 37.0 - 54.0 fl    MPV 8.9 6.0 - 12.0 fL    Platelets 398 140 - 450 10*3/mm3    Neutrophil % 67.0 42.7 - 76.0 %    Lymphocyte % 24.9 19.6 - 45.3 %    Monocyte % 5.0 5.0 - 12.0 %    Eosinophil % 2.0 0.3 - 6.2 %    Basophil % 0.7 0.0 - 1.5 %    Immature Grans % 0.4 0.0 - 0.5 %    Neutrophils, Absolute 6.50 1.70 - 7.00 10*3/mm3    Lymphocytes, Absolute 2.42 0.70 - 3.10 10*3/mm3    Monocytes, Absolute 0.49 0.10 - 0.90 10*3/mm3    Eosinophils, Absolute 0.19 0.00 - 0.40 10*3/mm3    Basophils, Absolute 0.07 0.00 - 0.20 10*3/mm3    Immature Grans, Absolute 0.04 0.00 - 0.05 10*3/mm3    nRBC 0.0 0.0 - 0.2 /100 WBC   Respiratory Panel PCR w/COVID-19(SARS-CoV-2) ROGER/ROSIE/MARTHA/PAD/COR/MANUEL In-House, [facility], 2 HR TAT - Swab, Nasopharynx    Collection Time: 04/16/24  6:26 PM    Specimen: Nasopharynx; Swab   Result Value Ref Range    ADENOVIRUS, PCR Not Detected Not Detected    Coronavirus 229E Not Detected Not Detected    Coronavirus HKU1 Not  Detected Not Detected    Coronavirus NL63 Not Detected Not Detected    Coronavirus OC43 Not Detected Not Detected    COVID19 Not Detected Not Detected - Ref. Range    Human Metapneumovirus Not Detected Not Detected    Human Rhinovirus/Enterovirus Not Detected Not Detected    Influenza A PCR Not Detected Not Detected    Influenza B PCR Not Detected Not Detected    Parainfluenza Virus 1 Not Detected Not Detected    Parainfluenza Virus 2 Not Detected Not Detected    Parainfluenza Virus 3 Not Detected Not Detected    Parainfluenza Virus 4 Not Detected Not Detected    RSV, PCR Not Detected Not Detected    Bordetella pertussis pcr Not Detected Not Detected    Bordetella parapertussis PCR Not Detected Not Detected    Chlamydophila pneumoniae PCR Not Detected Not Detected    Mycoplasma pneumo by PCR Not Detected Not Detected       Ordered the above labs and reviewed the results.        RADIOLOGY  No Radiology Exams Resulted Within Past 24 Hours    Ordered the above noted radiological studies. Reviewed by me in PACS.            MEDICATIONS GIVEN IN ER  Medications   dexAMETHasone (DECADRON) injection 8 mg (8 mg Intravenous Given 4/16/24 1833)   ketorolac (TORADOL) injection 15 mg (15 mg Intravenous Given 4/16/24 2035)               MEDICAL DECISION MAKING, PROGRESS, and CONSULTS    MDM: Patient presented emergency department with nonspecific pharyngitis, status post course of Augmentin.  Otherwise well-appearing, vitals otherwise stable.  Labs otherwise reassuring.  Exam demonstrating no evidence concerning for tonsillitis or peritonsillar abscess.  No sign of significantly worsening bacterial infection at this time either.  Unclear etiology of recurrent pharyngitis, will treat for now with Decadron and anti-inflammatory medications.  Provided with prescription for third-generation cephalosporin in case symptoms continue to worsen and there is concern for antibiotic failure.  Given return precautions and discharged with ENT  follow-up.    All labs have been independently reviewed by me.  All radiology studies have been reviewed by me and I have also reviewed the radiology report.   EKG's independently viewed and interpreted by me.  Discussion below represents my analysis of pertinent findings related to patient's condition, differential diagnosis, treatment plan and final disposition.      Additional sources:  - Discussed/ obtained information from independent historians:      - External (non-ED) record review:     - Chronic or social conditions impacting care:     - Shared decision making: Discussed plan for discharge with outpatient follow-up, patient agrees      Orders placed during this visit:  Orders Placed This Encounter   Procedures    Respiratory Panel PCR w/COVID-19(SARS-CoV-2) ROGER/ROSIE/MARTHA/PAD/COR/MANUEL In-House, NP Swab in UTM/VTM, 2 HR TAT - Swab, Nasopharynx    Comprehensive Metabolic Panel    Urinalysis With Microscopic If Indicated (No Culture) - Urine, Clean Catch    Mononucleosis Screen    EBV Antibody Profile    CBC Auto Differential    Ambulatory Referral to ENT (Otolaryngology)    CBC & Differential         Additional orders considered but not ordered:  Considered CT neck however no structural abnormality palpated on exam.        Differential diagnosis includes but is not limited to:    Pharyngitis, bacterial pharyngitis, viral pharyngitis, mononucleosis, allergic pharyngitis      Independent interpretation of labs, radiology studies, and discussions with consultants:  ED Course as of 04/17/24 0027 Tue Apr 16, 2024 1902 Monospot: Negative [FS]      ED Course User Index  [FS] Sonny Guzman MD           DIAGNOSIS  Final diagnoses:   Acute pharyngitis, unspecified etiology         DISPOSITION  Discharged home        Latest Documented Vital Signs:  As of 00:27 EDT  BP- 122/83 HR- 100 Temp- 98.8 °F (37.1 °C) O2 sat- 98%              --    Please note that portions of this were completed with a voice recognition  program.       Note Disclaimer: At [facility], we believe that sharing information builds trust and better relationships. You are receiving this note because you are receiving care at [facility] or recently visited. It is possible you will see health information before a provider has talked with you about it. This kind of information can be easy to misunderstand. To help you fully understand what it means for your health, we urge you to discuss this note with your provider.             Sonny Guzman MD  04/17/24 0030